# Patient Record
Sex: MALE | Race: OTHER | NOT HISPANIC OR LATINO | URBAN - METROPOLITAN AREA
[De-identification: names, ages, dates, MRNs, and addresses within clinical notes are randomized per-mention and may not be internally consistent; named-entity substitution may affect disease eponyms.]

---

## 2023-08-02 ENCOUNTER — EMERGENCY (EMERGENCY)
Facility: HOSPITAL | Age: 33
LOS: 1 days | Discharge: HOME | End: 2023-08-02
Payer: COMMERCIAL

## 2023-08-02 VITALS
WEIGHT: 164.02 LBS | HEART RATE: 90 BPM | OXYGEN SATURATION: 98 % | TEMPERATURE: 98 F | RESPIRATION RATE: 17 BRPM | DIASTOLIC BLOOD PRESSURE: 76 MMHG | SYSTOLIC BLOOD PRESSURE: 115 MMHG

## 2023-08-02 PROCEDURE — 96372 THER/PROPH/DIAG INJ SC/IM: CPT

## 2023-08-02 PROCEDURE — 90377 RABIES IG HT&SOL HUMAN IM/SC: CPT

## 2023-08-02 PROCEDURE — 99283 EMERGENCY DEPT VISIT LOW MDM: CPT | Mod: 25

## 2023-08-02 PROCEDURE — 90675 RABIES VACCINE IM: CPT

## 2023-08-02 PROCEDURE — 99283 EMERGENCY DEPT VISIT LOW MDM: CPT

## 2023-08-02 PROCEDURE — 90471 IMMUNIZATION ADMIN: CPT

## 2023-08-02 RX ORDER — RABIES VACC, HUMAN DIPLOID/PF 2.5 UNIT
1 VIAL (EA) INTRAMUSCULAR ONCE
Refills: 0 | Status: COMPLETED | OUTPATIENT
Start: 2023-08-02 | End: 2023-08-02

## 2023-08-02 RX ORDER — HUMAN RABIES VIRUS IMMUNE GLOBULIN 150 [IU]/ML
1500 INJECTION, SOLUTION INTRAMUSCULAR ONCE
Refills: 0 | Status: COMPLETED | OUTPATIENT
Start: 2023-08-02 | End: 2023-08-02

## 2023-08-02 RX ADMIN — HUMAN RABIES VIRUS IMMUNE GLOBULIN 1500 UNIT(S): 150 INJECTION, SOLUTION INTRAMUSCULAR at 21:29

## 2023-08-02 RX ADMIN — Medication 1 MILLILITER(S): at 21:25

## 2023-08-02 NOTE — ED PROVIDER NOTE - OBJECTIVE STATEMENT
33-year-old male, no significant past medical history, recently returned from [place], referred by his PMD for rabies prophylaxis.  Reports that [place] the [place] with a bat flying in side his room.  Did not notice any human bites.  Reports had a small rash to the right side of the rib cage which was little itchy but painless.  Denies any other complaints.

## 2023-08-02 NOTE — ED PROVIDER NOTE - NSFOLLOWUPINSTRUCTIONS_ED_ALL_ED_FT
Come back to the ER on:  August 5th (2nd vaccine)  August 9th (3rd vaccine)  Agust 16th (4th vaccine)

## 2023-08-02 NOTE — ED ADULT NURSE NOTE - ED STAT RN HANDOFF DETAILS
pt medically cleared for discharge. All discharge instructions and follow-up visits provided to pt, pt verbalized understanding, leaving ambulatory in no acute distress.

## 2023-08-02 NOTE — ED PROVIDER NOTE - CLINICAL SUMMARY MEDICAL DECISION MAKING FREE TEXT BOX
33-year-old male, presents for evaluation and treatment after exposure to a bat without known bite.  Also reports rash.  Rash does not appear to be cellulitic or vesicular nature.  Will give rabies immunoglobulin and first vaccine today.  Patient will need to come back on days 3, 7 and 14.

## 2023-08-02 NOTE — ED PROVIDER NOTE - PATIENT PORTAL LINK FT
You can access the FollowMyHealth Patient Portal offered by [facility] by registering at the following website: [email or web address]. By joining [employer]’s FollowMyHealth portal, you will also be able to view your health information using other applications (apps) compatible with our system.

## 2023-08-02 NOTE — ED ADULT NURSE NOTE - OBJECTIVE STATEMENT
Patient c/o possible bat bite while in [place]. Pt denies any pain, sob or dizziness. Small red rash/ bumps on right side.

## 2023-08-02 NOTE — ED ADULT TRIAGE NOTE - CHIEF COMPLAINT QUOTE
while in [place] saw a  bat in his room 3-4 days ago , unsure if he was bitten, noted with multiple red rash to right side of the body

## 2023-08-02 NOTE — ED ADULT NURSE NOTE - CHIEF COMPLAINT QUOTE
while in [place] saw a  bat in his room 3-4 days ago , unsure if he was bitten, noted with multiple red rash to right side of the body

## 2023-08-02 NOTE — ED PROVIDER NOTE - PHYSICAL EXAMINATION
Right anterior lateral side of the rib cage with small area of macular rash.  No blisters, open wounds, induration or tenderness to palpation.

## 2023-08-09 ENCOUNTER — EMERGENCY (EMERGENCY)
Facility: HOSPITAL | Age: 33
LOS: 1 days | Discharge: HOME | End: 2023-08-09
Attending: EMERGENCY MEDICINE
Payer: COMMERCIAL

## 2023-08-09 VITALS
RESPIRATION RATE: 16 BRPM | WEIGHT: 166.67 LBS | OXYGEN SATURATION: 97 % | DIASTOLIC BLOOD PRESSURE: 72 MMHG | HEART RATE: 99 BPM | TEMPERATURE: 98 F | SYSTOLIC BLOOD PRESSURE: 116 MMHG | HEIGHT: 67 IN

## 2023-08-09 PROCEDURE — 90471 IMMUNIZATION ADMIN: CPT

## 2023-08-09 PROCEDURE — 90675 RABIES VACCINE IM: CPT

## 2023-08-09 PROCEDURE — 99283 EMERGENCY DEPT VISIT LOW MDM: CPT

## 2023-08-09 PROCEDURE — 99281 EMR DPT VST MAYX REQ PHY/QHP: CPT | Mod: 25

## 2023-08-09 RX ORDER — RABIES VACC, HUMAN DIPLOID/PF 2.5 UNIT
1 VIAL (EA) INTRAMUSCULAR ONCE
Refills: 0 | Status: COMPLETED | OUTPATIENT
Start: 2023-08-09 | End: 2023-08-09

## 2023-08-09 RX ADMIN — Medication 1 MILLILITER(S): at 19:23

## 2023-08-09 NOTE — ED PROVIDER NOTE - PATIENT PORTAL LINK FT
English You can access the FollowMyHealth Patient Portal offered by [facility] by registering at the following website: [email or web address]. By joining [employer]’s FollowMyHealth portal, you will also be able to view your health information using other applications (apps) compatible with our system.

## 2023-08-09 NOTE — ED PROVIDER NOTE - OBJECTIVE STATEMENT
33-year-old male, presents for third rabies vaccine.  Received second dose at day 3 while in [place] as per our conversation during the first contact.  Denies any complaints.  Knows the need to come back in 1 week for last rabies vaccine.

## 2023-08-16 ENCOUNTER — EMERGENCY (EMERGENCY)
Facility: HOSPITAL | Age: 33
LOS: 1 days | Discharge: HOME | End: 2023-08-16
Attending: EMERGENCY MEDICINE
Payer: COMMERCIAL

## 2023-08-16 VITALS
HEIGHT: 67 IN | SYSTOLIC BLOOD PRESSURE: 113 MMHG | HEART RATE: 78 BPM | OXYGEN SATURATION: 98 % | DIASTOLIC BLOOD PRESSURE: 76 MMHG | RESPIRATION RATE: 18 BRPM | TEMPERATURE: 98 F

## 2023-08-16 PROCEDURE — 90471 IMMUNIZATION ADMIN: CPT

## 2023-08-16 PROCEDURE — 90675 RABIES VACCINE IM: CPT

## 2023-08-16 PROCEDURE — 99281 EMR DPT VST MAYX REQ PHY/QHP: CPT | Mod: 25

## 2023-08-16 PROCEDURE — 99283 EMERGENCY DEPT VISIT LOW MDM: CPT

## 2023-08-16 RX ORDER — RABIES VACC, HUMAN DIPLOID/PF 2.5 UNIT
1 VIAL (EA) INTRAMUSCULAR ONCE
Refills: 0 | Status: COMPLETED | OUTPATIENT
Start: 2023-08-16 | End: 2023-08-16

## 2023-08-16 RX ADMIN — Medication 1 MILLILITER(S): at 18:12

## 2023-08-16 NOTE — ED ADULT NURSE NOTE - NSFALLUNIVINTERV_ED_ALL_ED
Bed/Stretcher in lowest position, wheels locked, appropriate side rails in place/Call bell, personal items and telephone in reach/Instruct patient to call for assistance before getting out of bed/chair/stretcher/Non-slip footwear applied when patient is off stretcher/[place] to call system/Physically safe environment - no spills, clutter or unnecessary equipment/Purposeful proactive rounding/Room/bathroom lighting operational, light cord in reach

## 2023-08-16 NOTE — ED PROVIDER NOTE - PATIENT PORTAL LINK FT
You can access the FollowMyHealth Patient Portal offered by [facility] by registering at the following website: [email or web address]. By joining [employer]’s FollowMyHealth portal, you will also be able to view your health information using other applications (apps) compatible with our system.

## 2023-08-16 NOTE — ED PROVIDER NOTE - OBJECTIVE STATEMENT
33-year-old male, no significant past medical history presents for rabies vaccine 4th dose.  States while in [place] he found a bat in his hotel room however did not feel any bites.  States he was initially referred by his PMD for rabies prophylaxis. Denies any complaints currently.

## 2023-08-16 NOTE — ED PROVIDER NOTE - NSFOLLOWUPINSTRUCTIONS_ED_ALL_ED_FT
1) Follow up with your doctor   2) Return to the ED immediately for new or worsening symptoms   3) Please continue to take any home medications as prescribed

## 2023-08-16 NOTE — ED PROVIDER NOTE - PHYSICAL EXAMINATION
GEN:   comfortable, in no apparent distress, AOx3  EYES:   PERRL, extra-occular movements intact  HEENT:   airway patent, moist mucosal membranes, uvula midline  CV:  RRR, Pulses- Radial: 2+ bilateral and equal  RESP:   non-labored, speaking in full sentences  MSK:   no musculoskeletal tenderness, 5/5 strength, moving all extremities  SKIN:   dry, no rash  NEURO:   AOx3, no focal weakness or loss of sensation, gait normal, GCS 15  PSYCH: calm, cooperative, no apparent risk to self and others